# Patient Record
(demographics unavailable — no encounter records)

---

## 2024-10-25 NOTE — HEALTH RISK ASSESSMENT
[Former] : Former [Patient reported bone density results were abnormal] : Patient reported bone density results were abnormal [] :  [Fully functional (bathing, dressing, toileting, transferring, walking, feeding)] : Fully functional (bathing, dressing, toileting, transferring, walking, feeding) [Fully functional (using the telephone, shopping, preparing meals, housekeeping, doing laundry, using] : Fully functional and needs no help or supervision to perform IADLs (using the telephone, shopping, preparing meals, housekeeping, doing laundry, using transportation, managing medications and managing finances) [PHQ-2 Negative - No further assessment needed] : PHQ-2 Negative - No further assessment needed [AQU3Iieup] : 0 [Reports changes in hearing] : Reports no changes in hearing [Reports changes in vision] : Reports no changes in vision [BoneDensityDate] : 2020

## 2024-10-25 NOTE — PLAN
[FreeTextEntry1] : Needs mammo and breast US. C-scope will get this year. DEXA in 2025  f/u with ENT for FNA of thyroid nodule

## 2024-10-25 NOTE — END OF VISIT
[FreeTextEntry3] : I, Dr. Rosas, personally performed the evaluation and management (E/M) services for this established patient who presents today with (a) new problem(s)/exacerbation of (an) existing condition(s).  That E/M includes conducting the examination, assessing all new/exacerbated conditions, and establishing a new plan of care.  Today, my PA, Tanesha Lovell, was here to observe my evaluation and management services for this new problem/exacerbated condition to be followed going forward.

## 2024-10-25 NOTE — PHYSICAL EXAM
[No Acute Distress] : no acute distress [Well-Appearing] : well-appearing [Normal Sclera/Conjunctiva] : normal sclera/conjunctiva [Normal Outer Ear/Nose] : the outer ears and nose were normal in appearance [Supple] : supple [Normal Rate] : normal rate  [Regular Rhythm] : with a regular rhythm [No Edema] : there was no peripheral edema [Normal] : affect was normal and insight and judgment were intact

## 2024-10-25 NOTE — HISTORY OF PRESENT ILLNESS
[FreeTextEntry1] : annual physical [de-identified] : Pt is a 64 y/o F with PMHx of psoriasis, insomnia, thyroid nodule who presents to the office today for an annual physical.  Had labs drawn prior to office visit. Hypervitaminosis of vitamin D. she was instructed to stop vit D supplement which she has done. Was taking 5000 units daily. Upon questioning she is also taking Caltrate with vitamin D as well.  HCM - Covid vaccine: UTD - PNA vaccine: UTD - RSV: UTD - Influenza vaccine: UTD - Shingrix vaccine: UTD - Tdap vaccine: UTD  - Colonoscopy: will get in 2 months with Dr. Gonzalo Carter  - Mammo: needs  - Pap: needs  - DEXA: osteopenia 2020 - CT chest: not eligible. Vaping. - Ophthalmology: has UTD Dr. Beard - Dentist: UTD - Derm: UTD

## 2024-10-25 NOTE — HISTORY OF PRESENT ILLNESS
[FreeTextEntry1] : annual physical [de-identified] : Pt is a 64 y/o F with PMHx of psoriasis, insomnia, thyroid nodule who presents to the office today for an annual physical.  Had labs drawn prior to office visit. Hypervitaminosis of vitamin D. she was instructed to stop vit D supplement which she has done. Was taking 5000 units daily. Upon questioning she is also taking Caltrate with vitamin D as well.  HCM - Covid vaccine: UTD - PNA vaccine: UTD - RSV: UTD - Influenza vaccine: UTD - Shingrix vaccine: UTD - Tdap vaccine: UTD  - Colonoscopy: will get in 2 months with Dr. Gonzalo Carter  - Mammo: needs  - Pap: needs  - DEXA: osteopenia 2020 - CT chest: not eligible. Vaping. - Ophthalmology: has UTD Dr. Beard - Dentist: UTD - Derm: UTD

## 2024-10-25 NOTE — HEALTH RISK ASSESSMENT
[Former] : Former [Patient reported bone density results were abnormal] : Patient reported bone density results were abnormal [] :  [Fully functional (bathing, dressing, toileting, transferring, walking, feeding)] : Fully functional (bathing, dressing, toileting, transferring, walking, feeding) [Fully functional (using the telephone, shopping, preparing meals, housekeeping, doing laundry, using] : Fully functional and needs no help or supervision to perform IADLs (using the telephone, shopping, preparing meals, housekeeping, doing laundry, using transportation, managing medications and managing finances) [PHQ-2 Negative - No further assessment needed] : PHQ-2 Negative - No further assessment needed [[id number]] : 0 [Reports changes in hearing] : Reports no changes in hearing [Reports changes in vision] : Reports no changes in vision [BoneDensityDate] : 2020

## 2024-11-12 NOTE — CONSULT LETTER
Notified Lucille Mosher that [facility] has been trying to call her to schedule an appointment. She said she had her ringer off and will turn it on and will watch for the call. [Dear  ___] : Dear  [unfilled], [Consult Letter:] : I had the pleasure of evaluating your patient, [unfilled]. [Please see my note below.] : Please see my note below. [Consult Closing:] : Thank you very much for allowing me to participate in the care of this patient.  If you have any questions, please do not hesitate to contact me. [Sincerely,] : Sincerely, [FreeTextEntry3] :  Bhanu Murphy M.D., FACS, [facility] Director [facility] at [facility] Certified in Thyroid/Parathyroid/Neck Ultrasound, LOLLY/ AMANDA , Department of Otolaryngology [facility] at [facility]

## 2024-11-12 NOTE — CONSULT LETTER
[Dear  ___] : Dear  [unfilled], [Consult Letter:] : I had the pleasure of evaluating your patient, [unfilled]. [Please see my note below.] : Please see my note below. [Consult Closing:] : Thank you very much for allowing me to participate in the care of this patient.  If you have any questions, please do not hesitate to contact me. [Sincerely,] : Sincerely, [FreeTextEntry3] :  Bhanu Murphy M.D., FACS, [facility] Director [facility] at [facility] Certified in Thyroid/Parathyroid/Neck Ultrasound, LOLLY/ AMANDA , Department of Otolaryngology [facility] at [facility]

## 2024-11-12 NOTE — HISTORY OF PRESENT ILLNESS
[de-identified] : Angie is a 65-year-old female retired  in general good health who on a c-spine MRI this past September was found to have a left lobe thyroid nodule.  A dedicated thyroid ultrasound on 10/03/24 revealed a normal-sized thyroid gland.  The right lobe contains multiple benign spongiform nodules.  Within the left lobe there is a 1.7 x 1.1 x 1.3 cm lower pole nodule that is solid hyper echoic to isoechoic, wider than tall with smooth margins but contains punctate echogenic foci TI-RADS score TR4.  There is also a 9 x 5 x 9 mm left isthmus nodule TI-RADS TR 3 and no enlarged or abnormal appearing cervical lymph nodes.  Angie denies recent shortness of breath, voice changes (other than deeper due to vaping), dysphagia, anterior neck pain, neck pressure or mass. There is no family history of thyroid cancer. She denies any known radiation exposures in her youth. She is euthyroid.  She denies fever, body aches, cough, cyanosis, chest burning, anosmia or recent known COVID exposures.  All family members at home are well.  She is vaccinated and boosted.

## 2024-11-12 NOTE — PROCEDURE
[Image(s) Captured] : image(s) captured and filed [Unable to Cooperate with Mirror] : patient unable to cooperate with mirror [Gag Reflex] : gag reflex preventing mirror examination [Topical Lidocaine] : topical lidocaine [Oxymetazoline HCl] : oxymetazoline HCl [Flexible Endoscope] : examined with the flexible endoscope [Serial Number: ___] : Serial Number: [unfilled] [FreeTextEntry3] : ------------------------------------------------------------------------------------------- ...................................[facility] ...........ULTRASOUND-GUIDED THYROID FINE NEEDLE ASPIRATION BIOPSY REPORT  NAME: APOORVA JACOBS .........MR# [id number] .........: 1959 .........DATE: 2024 .........TIME: 2:25 PM.  HISTORY/ INDICATIONS: 65- year-old female with a left lower lobe posterior nodule, TI-RADS TR 5.  EXAM: Real-time high-resolution ultrasound imaging of the thyroid gland and/or other neck structures, was performed in the longitudinal and transverse planes with color power Doppler supplementation. Images were captured for lesion characterization, measurement and needle placement.  FINDINGS: A left lower lobe posterior nodule measuring 1.72 x 1.13 x 1.33 cm (longitudinal x AP x transverse) was identified and targeted for USG-FNA.  The nodule had the following ultrasonographic characteristics: solid, mildly hypoechoic, heterogeneous, irregular margins, multiple punctate echogenic foci, grade 2 vascularity on color Doppler, and wider-than-tall, TI-RADS TR 5.  PROCEDURE: A time out took place and documented for correct patient identifiers, site and side of procedure.  After obtaining informed consent with discussion of risks, benefits and alternatives, the patient was positioned semi-supine, the skin was prepped with alcohol and 0.5 cc of 1% Lidocaine with 1:100,000 epinephrine was injected for local anesthesia. A #25-gauge needle was then passed along the perpendicular plane of the transducer, positioned within the nodule and confirmed with ultrasonography.  Multiple aspirations were made within the nodule with two separate needle punctures, four passes each.  Aspirates were directly placed into both cytolyt and molecular preservative solutions for cytologic analysis, immunohistochemistry, and possible molecular genomic diagnostics.  The patient tolerated the procedure well without complications and was discharged with signed post-procedure instructions indicating the importance of following up on all results.   ASSESSMENT & PLAN: Successful USG-FNA of a left lower lobe posterior nodule was well tolerated without complications. The patient will be contacted to review the cytologic findings as soon as available for further treatment planning.  A discussion took place with the patient who accepted the responsibility to call the office to review the cytology results if no communication occurs within two weeks.   Electronically signed by referring, interpreting and reporting physician: EREN ROBLES M.D., FACS on 2024, 2:50 PM.  [facility]: [address] [place] [address], [address] [place] [address], [phone] (voice), [phone] (fax). -------------------------------------------------------------------------------------------------------------------------------------------------------------------------------------     [de-identified] : Verbal informed consent was obtained from the patient.  Findings: The nasal septum is minimally deviated to the left. Post surgical changes from rhinoplasty.  There are no masses or polyps, and the nasal mucosa and secretions are normal. The choanae and posterior nasopharynx are normal without masses or drainage. The Eustachian tube orifices appear patent. The pharynx, including the posterior and lateral pharyngeal walls, the vallecula and base of tongue are normal without ulcerations, lesions or masses. The hypopharynx including the pyriform sinuses open well without pooling of secretions, mucosal lesions or masses. The supraglottic larynx including the epiglottis, petiole, arytenoids, glossoepiglottic, aryepiglottic and pharyngoepiglottic folds are normal without mucosal lesions, ulcerations or masses. The glottis reveals normal false vocal folds. The true vocal folds are glistening white, tense and of equal length, without paralysis, having symmetric mobility on adduction and abduction. There are no mucosal lesions, nodules, cysts, erythroplasia or leukoplakia. The posterior cricoid area has healthy pink mucosa in the interarytenoid area and esophageal inlet. There is mild thickening/pachydermia of the interarytenoid mucosa suggestive of posterior laryngitis from laryngopharyngeal acid reflux disease. The trachea is clear without narrowing in the immediate subglottic region, without deviation or lesions. ------------------------------------------------------------------------------------------- [de-identified] : a thyroid neoplasm, GERD

## 2024-11-12 NOTE — PROCEDURE
[Image(s) Captured] : image(s) captured and filed [Unable to Cooperate with Mirror] : patient unable to cooperate with mirror [Gag Reflex] : gag reflex preventing mirror examination [Topical Lidocaine] : topical lidocaine [Oxymetazoline HCl] : oxymetazoline HCl [Flexible Endoscope] : examined with the flexible endoscope [Serial Number: ___] : Serial Number: [unfilled] [FreeTextEntry3] : ------------------------------------------------------------------------------------------- ...................................[facility] ...........ULTRASOUND-GUIDED THYROID FINE NEEDLE ASPIRATION BIOPSY REPORT  NAME: APOORVA JACOBS .........MR# [id number] .........: 1959 .........DATE: 2024 .........TIME: 2:25 PM.  HISTORY/ INDICATIONS: 65- year-old female with a left lower lobe posterior nodule, TI-RADS TR 5.  EXAM: Real-time high-resolution ultrasound imaging of the thyroid gland and/or other neck structures, was performed in the longitudinal and transverse planes with color power Doppler supplementation. Images were captured for lesion characterization, measurement and needle placement.  FINDINGS: A left lower lobe posterior nodule measuring 1.72 x 1.13 x 1.33 cm (longitudinal x AP x transverse) was identified and targeted for USG-FNA.  The nodule had the following ultrasonographic characteristics: solid, mildly hypoechoic, heterogeneous, irregular margins, multiple punctate echogenic foci, grade 2 vascularity on color Doppler, and wider-than-tall, TI-RADS TR 5.  PROCEDURE: A time out took place and documented for correct patient identifiers, site and side of procedure.  After obtaining informed consent with discussion of risks, benefits and alternatives, the patient was positioned semi-supine, the skin was prepped with alcohol and 0.5 cc of 1% Lidocaine with 1:100,000 epinephrine was injected for local anesthesia. A #25-gauge needle was then passed along the perpendicular plane of the transducer, positioned within the nodule and confirmed with ultrasonography.  Multiple aspirations were made within the nodule with two separate needle punctures, four passes each.  Aspirates were directly placed into both cytolyt and molecular preservative solutions for cytologic analysis, immunohistochemistry, and possible molecular genomic diagnostics.  The patient tolerated the procedure well without complications and was discharged with signed post-procedure instructions indicating the importance of following up on all results.   ASSESSMENT & PLAN: Successful USG-FNA of a left lower lobe posterior nodule was well tolerated without complications. The patient will be contacted to review the cytologic findings as soon as available for further treatment planning.  A discussion took place with the patient who accepted the responsibility to call the office to review the cytology results if no communication occurs within two weeks.   Electronically signed by referring, interpreting and reporting physician: EREN ROBLES M.D., FACS on 2024, 2:50 PM.  [facility]: [address] [place] [address], [address] [place] [id number], [phone] (voice), [phone] (fax). -------------------------------------------------------------------------------------------------------------------------------------------------------------------------------------     [de-identified] : Verbal informed consent was obtained from the patient.  Findings: The nasal septum is minimally deviated to the left. Post surgical changes from rhinoplasty.  There are no masses or polyps, and the nasal mucosa and secretions are normal. The choanae and posterior nasopharynx are normal without masses or drainage. The Eustachian tube orifices appear patent. The pharynx, including the posterior and lateral pharyngeal walls, the vallecula and base of tongue are normal without ulcerations, lesions or masses. The hypopharynx including the pyriform sinuses open well without pooling of secretions, mucosal lesions or masses. The supraglottic larynx including the epiglottis, petiole, arytenoids, glossoepiglottic, aryepiglottic and pharyngoepiglottic folds are normal without mucosal lesions, ulcerations or masses. The glottis reveals normal false vocal folds. The true vocal folds are glistening white, tense and of equal length, without paralysis, having symmetric mobility on adduction and abduction. There are no mucosal lesions, nodules, cysts, erythroplasia or leukoplakia. The posterior cricoid area has healthy pink mucosa in the interarytenoid area and esophageal inlet. There is mild thickening/pachydermia of the interarytenoid mucosa suggestive of posterior laryngitis from laryngopharyngeal acid reflux disease. The trachea is clear without narrowing in the immediate subglottic region, without deviation or lesions. ------------------------------------------------------------------------------------------- [de-identified] : a thyroid neoplasm, GERD

## 2024-11-12 NOTE — HISTORY OF PRESENT ILLNESS
[de-identified] : Angie is a 65-year-old female retired  in general good health who on a c-spine MRI this past September was found to have a left lobe thyroid nodule.  A dedicated thyroid ultrasound on 10/03/24 revealed a normal-sized thyroid gland.  The right lobe contains multiple benign spongiform nodules.  Within the left lobe there is a 1.7 x 1.1 x 1.3 cm lower pole nodule that is solid hyper echoic to isoechoic, wider than tall with smooth margins but contains punctate echogenic foci TI-RADS score TR4.  There is also a 9 x 5 x 9 mm left isthmus nodule TI-RADS TR 3 and no enlarged or abnormal appearing cervical lymph nodes.  Angie denies recent shortness of breath, voice changes (other than deeper due to vaping), dysphagia, anterior neck pain, neck pressure or mass. There is no family history of thyroid cancer. She denies any known radiation exposures in her youth. She is euthyroid.  She denies fever, body aches, cough, cyanosis, chest burning, anosmia or recent known COVID exposures.  All family members at home are well.  She is vaccinated and boosted.

## 2024-11-12 NOTE — PHYSICAL EXAM
[TextEntry] : Focused Head and Neck Examination:  General Appearance: The patient has a normal appearance (head and face), able to communicate with normal speech and is a well-groomed, well-developed, well-nourished, mildly overweight female in no apparent distress.  Breathing was silent and not labored. The patient was alert had normal affect and oriented to person, place, and time. The patient had normal facial and neck skin with normal facial symmetry, strength and motion, no visible/ palpable facial masses or sinus tenderness.  Otologic Exam: The pinnae and bilateral external ear canals were without lesions and clear.  The tympanic membranes were normal bilaterally without perforation and demonstrated normal mobility. There was no evidence of middle ear effusion or infection. Hearing is grossly normal to finger rub.   External Nasal Exam: The external nose was normal in appearance without lesions or deformity.    Intranasal Exam: There were no mucosal lesions, discolorations, nasal polyps, or masses. The nasal septum was intact without perforations. The nasal septum was deviated slightly to the left. The inferior turbinates were normal. The middle turbinates were normal bilaterally.  The middle meatus was clear, and the secretions were normal.  Nasopharynx: There were no visible masses, mucosal ulcerations, or other lesions.  Secretions were normal.   Oral cavity: The patient's lips and vermillion border were normal without lesions, ulcerations or discolorations. Dentition was good, the gums were normal, the oral mucosa was normal and there were no lesions, discolorations, ulcerations, erythema, or leukoplakia.  The floor of mouth was without lesions or palpable calculi. The oral tongue has normal mobility, without palpable or visible lesions, ulcerations, nodularity or tenderness.  All surfaces including lateral, ventral and dorsal mucosa examined and palpated. Expressed salivary flow was normal.  Stensens ducts are without palpable calculi.    Oropharynx: The tongue base was without palpable lesions, the soft palate was normal without lesions, the hard palate was normal without lesions, ulcerations, nodularity or discolorations.  The palatine tonsils present and normal, and the lingual tonsils were normal.  Pharynx: The lateral and posterior pharyngeal walls were intact without mucosal lesions, discolorations, ulcerations, or masses.    Larynx and Hypopharynx: Flexible fiber optic laryngoscopy was performed with topical anesthesia using 4% lidocaine and 0.5 % Oxymetazoline on cotton pledgets. More details of the exam are outlined in my procedure note but this examination revealed normal, symmetric vocal fold mobility and normal mucosa without vocal fold lesions, discolorations, or ulcerations.  There was slight posterior pachydermia the epiglottis and false vocal folds were normal without mucosal lesions. The piriform sinuses opened well and there were no lesions or pooling of saliva. The trachea was clear without narrowing in the immediate subglottic area and in midline position without lesions.    Neck Exam: There was no palpable lymphadenopathy or bruits in the central or lateral sulema drainage basins levels I-VI.  There was no asymmetry, pulsatile masses or nodules. The parotid and submandibular glands were not enlarged or tender and without palpable nodules or mass.  The temporomandibular joints were normal bilaterally without deviation/subluxation/click/tenderness or crepitus to palpation.    Thyroid Examination: The thyroid gland is not enlarged nontender without easily palpable nodules.  Neurological Exam: Cranial nerves II through XII were intact.  There was no visible tremor. Gross motor and sensory functions are normal.  A Chvostek sign was negative.  Ocular Exam: Pupils were equal and responsive to light.  Extraocular movements and gaze were normal. The sclera and conjunctiva were normal and anicteric.  Nystagmus was absent. No sign of erythema, ulcerations or lesions.    Respiratory: Respiratory effort is normal with symmetric chest expansion and no retractions or use of accessory muscles.    Cardiovascular: Extremities are normal with strong pulses, normal temperature, and no edema.

## 2024-11-12 NOTE — REASON FOR VISIT
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 [FreeTextEntry1] : PCP Alonoz Bernstein MD, Referrer

## 2024-11-12 NOTE — REASON FOR VISIT
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 [FreeTextEntry1] : PCP Alonzo Bernstein MD, Referrer

## 2025-04-15 NOTE — HISTORY OF PRESENT ILLNESS
[FreeTextEntry1] : 6 mo f/u [de-identified] : Pt is a 64 y/o F with PMHx of thyroid nodule who presents to the office today for 6 mo f/u  R foot displaced metatarsal fracture, possible Lisfranc injury. Had surgery 1/2025 with Dr. Steven Behren. Now in regular shoe, uses cane when she is out of the house. She is attending PT 2 x a week  Feeling well otherwise.